# Patient Record
Sex: FEMALE | Race: WHITE | Employment: PART TIME | URBAN - METROPOLITAN AREA
[De-identification: names, ages, dates, MRNs, and addresses within clinical notes are randomized per-mention and may not be internally consistent; named-entity substitution may affect disease eponyms.]

---

## 2024-01-02 ENCOUNTER — OFFICE VISIT (OUTPATIENT)
Dept: PRIMARY CARE CLINIC | Age: 19
End: 2024-01-02
Payer: COMMERCIAL

## 2024-01-02 VITALS
BODY MASS INDEX: 45.39 KG/M2 | WEIGHT: 289.2 LBS | DIASTOLIC BLOOD PRESSURE: 84 MMHG | SYSTOLIC BLOOD PRESSURE: 122 MMHG | HEART RATE: 98 BPM | HEIGHT: 67 IN | TEMPERATURE: 97.9 F | OXYGEN SATURATION: 98 %

## 2024-01-02 DIAGNOSIS — N92.1 MENORRHAGIA WITH IRREGULAR CYCLE: Primary | ICD-10-CM

## 2024-01-02 DIAGNOSIS — Z76.89 ENCOUNTER TO ESTABLISH CARE: ICD-10-CM

## 2024-01-02 PROCEDURE — G8427 DOCREV CUR MEDS BY ELIG CLIN: HCPCS | Performed by: NURSE PRACTITIONER

## 2024-01-02 PROCEDURE — G8417 CALC BMI ABV UP PARAM F/U: HCPCS | Performed by: NURSE PRACTITIONER

## 2024-01-02 PROCEDURE — 99203 OFFICE O/P NEW LOW 30 MIN: CPT | Performed by: NURSE PRACTITIONER

## 2024-01-02 PROCEDURE — G8484 FLU IMMUNIZE NO ADMIN: HCPCS | Performed by: NURSE PRACTITIONER

## 2024-01-02 PROCEDURE — 1036F TOBACCO NON-USER: CPT | Performed by: NURSE PRACTITIONER

## 2024-01-02 SDOH — ECONOMIC STABILITY: FOOD INSECURITY: WITHIN THE PAST 12 MONTHS, YOU WORRIED THAT YOUR FOOD WOULD RUN OUT BEFORE YOU GOT MONEY TO BUY MORE.: NEVER TRUE

## 2024-01-02 SDOH — ECONOMIC STABILITY: HOUSING INSECURITY
IN THE LAST 12 MONTHS, WAS THERE A TIME WHEN YOU DID NOT HAVE A STEADY PLACE TO SLEEP OR SLEPT IN A SHELTER (INCLUDING NOW)?: NO

## 2024-01-02 SDOH — ECONOMIC STABILITY: FOOD INSECURITY: WITHIN THE PAST 12 MONTHS, THE FOOD YOU BOUGHT JUST DIDN'T LAST AND YOU DIDN'T HAVE MONEY TO GET MORE.: NEVER TRUE

## 2024-01-02 SDOH — ECONOMIC STABILITY: INCOME INSECURITY: HOW HARD IS IT FOR YOU TO PAY FOR THE VERY BASICS LIKE FOOD, HOUSING, MEDICAL CARE, AND HEATING?: NOT HARD AT ALL

## 2024-01-02 ASSESSMENT — PATIENT HEALTH QUESTIONNAIRE - PHQ9
SUM OF ALL RESPONSES TO PHQ9 QUESTIONS 1 & 2: 0
1. LITTLE INTEREST OR PLEASURE IN DOING THINGS: 0
SUM OF ALL RESPONSES TO PHQ QUESTIONS 1-9: 0
2. FEELING DOWN, DEPRESSED OR HOPELESS: 0
SUM OF ALL RESPONSES TO PHQ QUESTIONS 1-9: 0

## 2024-01-02 ASSESSMENT — ENCOUNTER SYMPTOMS
EYE PAIN: 0
SINUS PAIN: 0
ABDOMINAL DISTENTION: 0
CHEST TIGHTNESS: 0
ABDOMINAL PAIN: 1
PHOTOPHOBIA: 0
DIARRHEA: 1
BACK PAIN: 0
COUGH: 1
SINUS PRESSURE: 0
COLOR CHANGE: 0
BLOOD IN STOOL: 0
SORE THROAT: 0
VOICE CHANGE: 0
FACIAL SWELLING: 0
VOMITING: 0
RHINORRHEA: 0
NAUSEA: 0
EYE DISCHARGE: 0
CHOKING: 0
WHEEZING: 0
EYE REDNESS: 0
TROUBLE SWALLOWING: 0
CONSTIPATION: 0
EYE ITCHING: 0
SHORTNESS OF BREATH: 0

## 2024-01-02 ASSESSMENT — VISUAL ACUITY: OU: 1

## 2024-01-02 NOTE — PROGRESS NOTES
TOOTH EXTRACTION Bilateral      Family History   Problem Relation Age of Onset    Thyroid Disease Mother     Heart Failure Mother     Diabetes Mother     Diabetes Father     No Known Problems Half-Sister     Depression Half-Sister     Diabetes Half-Sister     Bipolar Disorder Half-Sister     Depression Half-Sister     No Known Problems Half-Sister     Diabetes Half-Sister     Depression Half-Sister     Depression Half-Sister     Depression Half-Sister     No Known Problems Half-Brother     No Known Problems Half-Brother      Social History     Socioeconomic History    Marital status: Single     Spouse name: Not on file    Number of children: Not on file    Years of education: Not on file    Highest education level: Not on file   Occupational History    Not on file   Tobacco Use    Smoking status: Never    Smokeless tobacco: Never    Tobacco comments:     She currently vapes   Vaping Use    Vaping Use: Every day    Start date: 1/1/2021    Substances: Nicotine    Devices: Disposable   Substance and Sexual Activity    Alcohol use: Not Currently    Drug use: Never    Sexual activity: Not on file   Other Topics Concern    Not on file   Social History Narrative    Not on file     Social Determinants of Health     Financial Resource Strain: Low Risk  (1/2/2024)    Overall Financial Resource Strain (CARDIA)     Difficulty of Paying Living Expenses: Not hard at all   Food Insecurity: No Food Insecurity (1/2/2024)    Hunger Vital Sign     Worried About Running Out of Food in the Last Year: Never true     Ran Out of Food in the Last Year: Never true   Transportation Needs: Unknown (1/2/2024)    PRAPARE - Transportation     Lack of Transportation (Medical): Not on file     Lack of Transportation (Non-Medical): No   Physical Activity: Not on file   Stress: Not on file   Social Connections: Not on file   Intimate Partner Violence: Not on file   Housing Stability: Unknown (1/2/2024)    Housing Stability Vital Sign     Unable to

## 2024-01-26 DIAGNOSIS — Z76.89 ENCOUNTER TO ESTABLISH CARE: ICD-10-CM

## 2024-01-26 LAB
ABSOLUTE IMMATURE GRANULOCYTE: <0.03 K/UL (ref 0–0.58)
ALBUMIN SERPL-MCNC: 4.1 G/DL (ref 3.5–5.2)
ALP BLD-CCNC: 120 U/L (ref 35–104)
ALT SERPL-CCNC: 17 U/L (ref 0–32)
ANION GAP SERPL CALCULATED.3IONS-SCNC: 19 MMOL/L (ref 7–16)
AST SERPL-CCNC: 14 U/L (ref 0–31)
BASOPHILS ABSOLUTE: 0.03 K/UL (ref 0–0.2)
BASOPHILS RELATIVE PERCENT: 0 % (ref 0–2)
BILIRUB SERPL-MCNC: 0.2 MG/DL (ref 0–1.2)
BUN BLDV-MCNC: 10 MG/DL (ref 6–20)
CALCIUM SERPL-MCNC: 9.5 MG/DL (ref 8.6–10.2)
CHLORIDE BLD-SCNC: 106 MMOL/L (ref 98–107)
CHOLESTEROL: 192 MG/DL
CO2: 19 MMOL/L (ref 22–29)
CREAT SERPL-MCNC: 0.6 MG/DL (ref 0.5–1)
EOSINOPHILS ABSOLUTE: 0.06 K/UL (ref 0.05–0.5)
EOSINOPHILS RELATIVE PERCENT: 1 % (ref 0–6)
GFR SERPL CREATININE-BSD FRML MDRD: >60 ML/MIN/1.73M2
GLUCOSE BLD-MCNC: 92 MG/DL (ref 74–99)
HCT VFR BLD CALC: 43 % (ref 34–48)
HDLC SERPL-MCNC: 44 MG/DL
HEMOGLOBIN: 13.2 G/DL (ref 11.5–15.5)
IMMATURE GRANULOCYTES: 0 % (ref 0–5)
LDL CHOLESTEROL: 117 MG/DL
LYMPHOCYTES ABSOLUTE: 2.42 K/UL (ref 1.5–4)
LYMPHOCYTES RELATIVE PERCENT: 32 % (ref 20–42)
MCH RBC QN AUTO: 25.3 PG (ref 26–35)
MCHC RBC AUTO-ENTMCNC: 30.7 G/DL (ref 32–34.5)
MCV RBC AUTO: 82.5 FL (ref 80–99.9)
MONOCYTES ABSOLUTE: 0.56 K/UL (ref 0.1–0.95)
MONOCYTES RELATIVE PERCENT: 7 % (ref 2–12)
NEUTROPHILS ABSOLUTE: 4.44 K/UL (ref 1.8–7.3)
NEUTROPHILS RELATIVE PERCENT: 59 % (ref 43–80)
PDW BLD-RTO: 14.6 % (ref 11.5–15)
PLATELET # BLD: 336 K/UL (ref 130–450)
PMV BLD AUTO: 10.8 FL (ref 7–12)
POTASSIUM SERPL-SCNC: 4.1 MMOL/L (ref 3.5–5)
RBC # BLD: 5.21 M/UL (ref 3.5–5.5)
SODIUM BLD-SCNC: 144 MMOL/L (ref 132–146)
TOTAL PROTEIN: 7.4 G/DL (ref 6.4–8.3)
TRIGL SERPL-MCNC: 154 MG/DL
TSH SERPL DL<=0.05 MIU/L-ACNC: 2.14 UIU/ML (ref 0.27–4.2)
VLDLC SERPL CALC-MCNC: 31 MG/DL
WBC # BLD: 7.5 K/UL (ref 4.5–11.5)

## 2024-01-30 ENCOUNTER — OFFICE VISIT (OUTPATIENT)
Dept: PRIMARY CARE CLINIC | Age: 19
End: 2024-01-30
Payer: COMMERCIAL

## 2024-01-30 VITALS
BODY MASS INDEX: 45.33 KG/M2 | TEMPERATURE: 97.6 F | OXYGEN SATURATION: 97 % | HEART RATE: 98 BPM | SYSTOLIC BLOOD PRESSURE: 142 MMHG | WEIGHT: 289.4 LBS | DIASTOLIC BLOOD PRESSURE: 96 MMHG

## 2024-01-30 DIAGNOSIS — N92.1 MENORRHAGIA WITH IRREGULAR CYCLE: ICD-10-CM

## 2024-01-30 DIAGNOSIS — I10 PRIMARY HYPERTENSION: Primary | ICD-10-CM

## 2024-01-30 PROCEDURE — G8427 DOCREV CUR MEDS BY ELIG CLIN: HCPCS | Performed by: NURSE PRACTITIONER

## 2024-01-30 PROCEDURE — G8417 CALC BMI ABV UP PARAM F/U: HCPCS | Performed by: NURSE PRACTITIONER

## 2024-01-30 PROCEDURE — 99214 OFFICE O/P EST MOD 30 MIN: CPT | Performed by: NURSE PRACTITIONER

## 2024-01-30 PROCEDURE — 3077F SYST BP >= 140 MM HG: CPT | Performed by: NURSE PRACTITIONER

## 2024-01-30 PROCEDURE — G8484 FLU IMMUNIZE NO ADMIN: HCPCS | Performed by: NURSE PRACTITIONER

## 2024-01-30 PROCEDURE — 3080F DIAST BP >= 90 MM HG: CPT | Performed by: NURSE PRACTITIONER

## 2024-01-30 PROCEDURE — 1036F TOBACCO NON-USER: CPT | Performed by: NURSE PRACTITIONER

## 2024-01-30 RX ORDER — LISINOPRIL 10 MG/1
10 TABLET ORAL DAILY
Qty: 90 TABLET | Refills: 1 | Status: SHIPPED
Start: 2024-01-30 | End: 2024-01-31 | Stop reason: SDUPTHER

## 2024-01-30 ASSESSMENT — ENCOUNTER SYMPTOMS
SORE THROAT: 0
CHOKING: 0
CHEST TIGHTNESS: 0
EYE REDNESS: 0
SINUS PAIN: 0
SHORTNESS OF BREATH: 0
NAUSEA: 0
COLOR CHANGE: 0
BACK PAIN: 1
WHEEZING: 0
BLOOD IN STOOL: 0
PHOTOPHOBIA: 0
COUGH: 1
EYE PAIN: 0
VOMITING: 0
ABDOMINAL PAIN: 0
FACIAL SWELLING: 0
RHINORRHEA: 1
EYE DISCHARGE: 0
ABDOMINAL DISTENTION: 0
DIARRHEA: 0
CONSTIPATION: 0
VOICE CHANGE: 0
SINUS PRESSURE: 0
TROUBLE SWALLOWING: 0
EYE ITCHING: 0

## 2024-01-30 NOTE — PROGRESS NOTES
24  Adam Collins : 2005 Sex: female  Age: 18 y.o.    Chief Complaint   Patient presents with    Follow-up     3 week    Discuss Labs       Adam is seen today for a follow-up and a review of her overall condition and plan of care.  She is also here to discuss labs, that she has had recently drawn.  She states that she is doing fairly well today, but does state that she has been having some rhinorrhea, postnasal drainage and a cough, from the drainage.  I did advise her to take an over-the-counter allergy medicine, approximately an hour before she go to bed, with hopes that this would help to dry her up and alleviate her symptoms.  She also reports that she has been having some lower back pain for the past week or two.  She states that she is not aware of any injury, that may have caused this pain.  Lastly, her blood pressure has not been noted to be elevated today.  It was also noted to be elevated at her last visit, which was her new patient visit.  I did discuss with her the importance of cutting back on salt and caffeine.  I will place her on lisinopril 10 mg p.o. daily, to see if this helps.  I did advise her to contact us if she had any trouble with the medication.  We will follow her up in 1 month.  She does report understanding.  She denies any acute confusion, forgetfulness or any change in cognition.        Review of Systems   Constitutional:  Negative for appetite change, chills, diaphoresis, fatigue, fever and unexpected weight change.   HENT:  Positive for postnasal drip and rhinorrhea. Negative for congestion, ear pain, facial swelling, hearing loss, nosebleeds, sinus pressure, sinus pain, sneezing, sore throat, tinnitus, trouble swallowing and voice change.    Eyes:  Negative for photophobia, pain, discharge, redness and itching.   Respiratory:  Positive for cough (From drainage). Negative for choking, chest tightness, shortness of breath and wheezing.    Cardiovascular:  Negative

## 2024-01-31 DIAGNOSIS — I10 PRIMARY HYPERTENSION: ICD-10-CM

## 2024-01-31 RX ORDER — LISINOPRIL 10 MG/1
10 TABLET ORAL DAILY
Qty: 90 TABLET | Refills: 1 | Status: SHIPPED | OUTPATIENT
Start: 2024-01-31

## 2024-01-31 NOTE — TELEPHONE ENCOUNTER
Patient called in stating her dogs got a hold of her pill bottle and must have eaten all her bp meds.  She has only found 3.   I told her insurance may not pay for it and I did advise her to call her vet as well.

## 2024-02-28 ENCOUNTER — OFFICE VISIT (OUTPATIENT)
Dept: PRIMARY CARE CLINIC | Age: 19
End: 2024-02-28
Payer: COMMERCIAL

## 2024-02-28 VITALS
OXYGEN SATURATION: 95 % | TEMPERATURE: 97.3 F | DIASTOLIC BLOOD PRESSURE: 74 MMHG | SYSTOLIC BLOOD PRESSURE: 120 MMHG | HEART RATE: 101 BPM | BODY MASS INDEX: 43.63 KG/M2 | WEIGHT: 278.6 LBS

## 2024-02-28 DIAGNOSIS — N92.1 MENORRHAGIA WITH IRREGULAR CYCLE: ICD-10-CM

## 2024-02-28 DIAGNOSIS — I10 PRIMARY HYPERTENSION: Primary | ICD-10-CM

## 2024-02-28 PROCEDURE — G8417 CALC BMI ABV UP PARAM F/U: HCPCS | Performed by: NURSE PRACTITIONER

## 2024-02-28 PROCEDURE — G8427 DOCREV CUR MEDS BY ELIG CLIN: HCPCS | Performed by: NURSE PRACTITIONER

## 2024-02-28 PROCEDURE — 1036F TOBACCO NON-USER: CPT | Performed by: NURSE PRACTITIONER

## 2024-02-28 PROCEDURE — 99213 OFFICE O/P EST LOW 20 MIN: CPT | Performed by: NURSE PRACTITIONER

## 2024-02-28 PROCEDURE — G8484 FLU IMMUNIZE NO ADMIN: HCPCS | Performed by: NURSE PRACTITIONER

## 2024-02-28 PROCEDURE — 3078F DIAST BP <80 MM HG: CPT | Performed by: NURSE PRACTITIONER

## 2024-02-28 PROCEDURE — 3074F SYST BP LT 130 MM HG: CPT | Performed by: NURSE PRACTITIONER

## 2024-02-28 ASSESSMENT — ENCOUNTER SYMPTOMS
EYE DISCHARGE: 0
ABDOMINAL DISTENTION: 0
CONSTIPATION: 0
COLOR CHANGE: 0
TROUBLE SWALLOWING: 0
PHOTOPHOBIA: 0
COUGH: 0
DIARRHEA: 0
ABDOMINAL PAIN: 0
SORE THROAT: 0
RHINORRHEA: 0
EYE PAIN: 0
EYE ITCHING: 0
BLOOD IN STOOL: 0
WHEEZING: 0
NAUSEA: 0
BACK PAIN: 0
CHOKING: 0
VOMITING: 0
EYE REDNESS: 0
SHORTNESS OF BREATH: 0
SINUS PAIN: 0
FACIAL SWELLING: 0
SINUS PRESSURE: 0
CHEST TIGHTNESS: 0
VOICE CHANGE: 0

## 2024-02-28 NOTE — PROGRESS NOTES
24  Adam Collins : 2005 Sex: female  Age: 18 y.o.    Chief Complaint   Patient presents with    1 Month Follow-Up       Adam is seen today for a 1 month follow-up and a review of her overall condition and plan of care.  At the last visit, I placed her on lisinopril 10 mg p.o. daily.  Her blood pressure is doing well and controlled with this medication.  This did have to be refilled, as she states that her sister's dog ate the entire bottle of lisinopril.  She states that the dog ingested nearly 900 mg of lisinopril.  She states that the dog did fine and has had no further issues.  Lastly, she states that she does have some anxiety and states \"that's normal\".  She is accompanied by her boyfriend.  She denies any acute confusion, forgetfulness or any change in cognition.        Review of Systems   Constitutional:  Negative for appetite change, chills, diaphoresis, fatigue, fever and unexpected weight change.   HENT:  Negative for congestion, ear pain, facial swelling, hearing loss, nosebleeds, postnasal drip, rhinorrhea, sinus pressure, sinus pain, sneezing, sore throat, tinnitus, trouble swallowing and voice change.    Eyes:  Negative for photophobia, pain, discharge, redness and itching.   Respiratory:  Negative for cough, choking, chest tightness, shortness of breath and wheezing.    Cardiovascular:  Negative for chest pain, palpitations and leg swelling.   Gastrointestinal:  Negative for abdominal distention, abdominal pain, blood in stool, constipation, diarrhea, nausea and vomiting.   Endocrine: Negative for cold intolerance, heat intolerance, polydipsia, polyphagia and polyuria.   Genitourinary:  Negative for difficulty urinating, dysuria, flank pain, frequency, hematuria and urgency.   Musculoskeletal:  Negative for arthralgias, back pain, gait problem, joint swelling, myalgias, neck pain and neck stiffness.   Skin:  Negative for color change, rash and wound.   Allergic/Immunologic:

## 2024-05-21 ENCOUNTER — TELEPHONE (OUTPATIENT)
Dept: PRIMARY CARE CLINIC | Age: 19
End: 2024-05-21

## 2024-06-04 DIAGNOSIS — Z20.7 EXPOSURE TO PEDICULOSIS CAPITIS: Primary | ICD-10-CM

## 2024-08-13 ENCOUNTER — TELEPHONE (OUTPATIENT)
Dept: PRIMARY CARE CLINIC | Age: 19
End: 2024-08-13